# Patient Record
Sex: FEMALE | Race: WHITE | NOT HISPANIC OR LATINO | URBAN - METROPOLITAN AREA
[De-identification: names, ages, dates, MRNs, and addresses within clinical notes are randomized per-mention and may not be internally consistent; named-entity substitution may affect disease eponyms.]

---

## 2018-10-23 ENCOUNTER — OFFICE VISIT (OUTPATIENT)
Dept: URBAN - METROPOLITAN AREA CLINIC 45 | Facility: CLINIC | Age: 78
End: 2018-10-23
Payer: MEDICARE

## 2018-10-23 DIAGNOSIS — H43.813 VITREOUS DEGENERATION, BILATERAL: ICD-10-CM

## 2018-10-23 PROCEDURE — 92004 COMPRE OPH EXAM NEW PT 1/>: CPT | Performed by: OPHTHALMOLOGY

## 2018-10-23 RX ORDER — OFLOXACIN 3 MG/ML
0.3 % SOLUTION/ DROPS OPHTHALMIC
Qty: 10 | Refills: 0 | Status: INACTIVE
Start: 2018-10-23 | End: 2018-12-05

## 2018-10-23 RX ORDER — PREDNISOLONE ACETATE 10 MG/ML
1 % SUSPENSION/ DROPS OPHTHALMIC
Qty: 10 | Refills: 1 | Status: INACTIVE
Start: 2018-10-23 | End: 2018-12-05

## 2018-10-23 ASSESSMENT — INTRAOCULAR PRESSURE
OD: 13
OS: 13

## 2018-10-23 ASSESSMENT — KERATOMETRY
OD: 42.25
OS: 43.00

## 2018-10-23 ASSESSMENT — VISUAL ACUITY
OD: 20/30
OS: 20/30

## 2018-10-23 NOTE — IMPRESSION/PLAN
Impression: Combined forms of age-related cataract, bilateral: H25.813. Plan: OK for ce/iol OD then OS in [facility] [address] 27, RL2. Distance target. Discussed diagnosis of cataracts. Cataracts are limiting vision. Discussed risks, benefits and alternatives to surgery including but not limited to: bleeding, infection, risk of vision loss, loss of the eye, need for other surgery. Patient voiced understanding and wishes to proceed.

## 2018-10-23 NOTE — IMPRESSION/PLAN
Impression: Vitreous degeneration, bilateral: H43.813. Plan: Posterior vitreous detachment accounts for the patient's complaints. There is no evidence of retinal pathology. All signs and risks of retinal detachment and tears were discussed in detail. Patient instructed to call the office immediately if any symptoms noted.

## 2018-12-05 ENCOUNTER — TESTING ONLY (OUTPATIENT)
Dept: URBAN - METROPOLITAN AREA CLINIC 45 | Facility: CLINIC | Age: 78
End: 2018-12-05
Payer: MEDICARE

## 2018-12-05 DIAGNOSIS — H25.813 COMBINED FORMS OF AGE-RELATED CATARACT, BILATERAL: Primary | ICD-10-CM

## 2018-12-05 PROCEDURE — 92136 OPHTHALMIC BIOMETRY: CPT | Performed by: OPHTHALMOLOGY

## 2018-12-05 RX ORDER — OFLOXACIN 3 MG/ML
0.3 % SOLUTION/ DROPS OPHTHALMIC
Qty: 10 | Refills: 0 | Status: INACTIVE
Start: 2018-12-05 | End: 2019-01-16

## 2018-12-05 RX ORDER — PREDNISOLONE ACETATE 10 MG/ML
1 % SUSPENSION/ DROPS OPHTHALMIC
Qty: 10 | Refills: 1 | Status: INACTIVE
Start: 2018-12-05 | End: 2019-02-15

## 2018-12-05 ASSESSMENT — PACHYMETRY
OS: 22.64
OD: 2.95
OD: 22.60
OS: 2.79

## 2018-12-26 ENCOUNTER — SURGERY (OUTPATIENT)
Dept: URBAN - METROPOLITAN AREA SURGERY 20 | Facility: SURGERY | Age: 78
End: 2018-12-26
Payer: MEDICARE

## 2018-12-26 PROCEDURE — 66984 XCAPSL CTRC RMVL W/O ECP: CPT | Performed by: OPHTHALMOLOGY

## 2018-12-27 ENCOUNTER — POST-OPERATIVE VISIT (OUTPATIENT)
Dept: URBAN - METROPOLITAN AREA CLINIC 45 | Facility: CLINIC | Age: 78
End: 2018-12-27

## 2018-12-27 PROCEDURE — 99024 POSTOP FOLLOW-UP VISIT: CPT | Performed by: OPTOMETRIST

## 2018-12-27 ASSESSMENT — INTRAOCULAR PRESSURE
OD: 22
OS: 13

## 2018-12-31 ENCOUNTER — POST-OPERATIVE VISIT (OUTPATIENT)
Dept: URBAN - METROPOLITAN AREA CLINIC 45 | Facility: CLINIC | Age: 78
End: 2018-12-31

## 2018-12-31 DIAGNOSIS — Z09 ENCNTR FOR F/U EXAM AFT TRTMT FOR COND OTH THAN MALIG NEOPLM: Primary | ICD-10-CM

## 2018-12-31 PROCEDURE — 99024 POSTOP FOLLOW-UP VISIT: CPT | Performed by: OPHTHALMOLOGY

## 2018-12-31 ASSESSMENT — INTRAOCULAR PRESSURE
OD: 17
OS: 14

## 2019-01-08 ENCOUNTER — POST-OPERATIVE VISIT (OUTPATIENT)
Dept: URBAN - METROPOLITAN AREA CLINIC 45 | Facility: CLINIC | Age: 79
End: 2019-01-08

## 2019-01-08 PROCEDURE — 99024 POSTOP FOLLOW-UP VISIT: CPT | Performed by: OPTOMETRIST

## 2019-01-08 ASSESSMENT — INTRAOCULAR PRESSURE
OS: 14
OD: 12

## 2019-01-08 ASSESSMENT — VISUAL ACUITY: OD: 20/30

## 2019-01-09 ENCOUNTER — SURGERY (OUTPATIENT)
Dept: URBAN - METROPOLITAN AREA SURGERY 20 | Facility: SURGERY | Age: 79
End: 2019-01-09
Payer: MEDICARE

## 2019-01-09 PROCEDURE — 66984 XCAPSL CTRC RMVL W/O ECP: CPT | Performed by: OPHTHALMOLOGY

## 2019-01-10 ENCOUNTER — POST-OPERATIVE VISIT (OUTPATIENT)
Dept: URBAN - METROPOLITAN AREA CLINIC 45 | Facility: CLINIC | Age: 79
End: 2019-01-10

## 2019-01-10 PROCEDURE — 99024 POSTOP FOLLOW-UP VISIT: CPT | Performed by: OPTOMETRIST

## 2019-01-10 ASSESSMENT — INTRAOCULAR PRESSURE
OS: 14
OD: 12

## 2019-01-16 ENCOUNTER — POST-OPERATIVE VISIT (OUTPATIENT)
Dept: URBAN - METROPOLITAN AREA CLINIC 45 | Facility: CLINIC | Age: 79
End: 2019-01-16

## 2019-01-16 PROCEDURE — 99024 POSTOP FOLLOW-UP VISIT: CPT | Performed by: OPTOMETRIST

## 2019-01-16 ASSESSMENT — INTRAOCULAR PRESSURE
OD: 12
OS: 13

## 2019-02-15 ENCOUNTER — POST-OPERATIVE VISIT (OUTPATIENT)
Dept: URBAN - METROPOLITAN AREA CLINIC 45 | Facility: CLINIC | Age: 79
End: 2019-02-15
Payer: MEDICARE

## 2019-02-15 DIAGNOSIS — H52.4 PRESBYOPIA: ICD-10-CM

## 2019-02-15 PROCEDURE — 99024 POSTOP FOLLOW-UP VISIT: CPT | Performed by: OPTOMETRIST

## 2019-02-15 ASSESSMENT — INTRAOCULAR PRESSURE
OS: 12
OD: 13

## 2019-02-15 ASSESSMENT — VISUAL ACUITY
OD: 20/25
OS: 20/25

## 2019-02-27 ENCOUNTER — OFFICE VISIT (OUTPATIENT)
Dept: URBAN - METROPOLITAN AREA CLINIC 45 | Facility: CLINIC | Age: 79
End: 2019-02-27
Payer: MEDICARE

## 2019-02-27 DIAGNOSIS — H02.35 BLEPHAROCHALASIS LEFT LOWER EYELID: ICD-10-CM

## 2019-02-27 DIAGNOSIS — H02.32 BLEPHAROCHALASIS RIGHT LOWER EYELID: Primary | ICD-10-CM

## 2019-02-27 PROCEDURE — 92285 EXTERNAL OCULAR PHOTOGRAPHY: CPT | Performed by: OPHTHALMOLOGY

## 2019-02-27 PROCEDURE — 99214 OFFICE O/P EST MOD 30 MIN: CPT | Performed by: OPHTHALMOLOGY

## 2019-02-27 ASSESSMENT — INTRAOCULAR PRESSURE
OS: 12
OD: 12

## 2019-02-27 NOTE — IMPRESSION/PLAN
Impression: Blepharochalasis right lower eyelid: H02.32. Photo Interpretation: supports clinical findings and dx documented in chart Plan: Discussed diagnosis in detail with patient. Discussed treatment options with patient. Discussed cosmetic bilateral lower eyelid blepharoplasty in detail with patient today. RBA's discussed with patient. Patient does not wish to proceed with surgical intervention at this time.

## 2019-03-01 ENCOUNTER — RX ONLY (RX ONLY)
Age: 79
End: 2019-03-01

## 2019-04-09 ENCOUNTER — POST-OPERATIVE VISIT (OUTPATIENT)
Dept: URBAN - METROPOLITAN AREA CLINIC 45 | Facility: CLINIC | Age: 79
End: 2019-04-09

## 2019-04-09 PROCEDURE — 99024 POSTOP FOLLOW-UP VISIT: CPT | Performed by: OPTOMETRIST

## 2019-04-09 ASSESSMENT — INTRAOCULAR PRESSURE
OS: 12
OD: 12

## 2019-11-07 ENCOUNTER — APPOINTMENT (OUTPATIENT)
Age: 79
Setting detail: DERMATOLOGY
End: 2019-11-07

## 2019-11-07 VITALS
HEART RATE: 66 BPM | DIASTOLIC BLOOD PRESSURE: 90 MMHG | SYSTOLIC BLOOD PRESSURE: 140 MMHG | WEIGHT: 202 LBS | HEIGHT: 64 IN

## 2019-11-07 DIAGNOSIS — L30.4 ERYTHEMA INTERTRIGO: ICD-10-CM

## 2019-11-07 DIAGNOSIS — L82.0 INFLAMED SEBORRHEIC KERATOSIS: ICD-10-CM

## 2019-11-07 DIAGNOSIS — R03.0 ELEVATED BLOOD-PRESSURE READING, WITHOUT DIAGNOSIS OF HYPERTENSION: ICD-10-CM

## 2019-11-07 PROCEDURE — OTHER LIQUID NITROGEN: OTHER

## 2019-11-07 PROCEDURE — 17110 DESTRUCT B9 LESION 1-14: CPT

## 2019-11-07 PROCEDURE — 99213 OFFICE O/P EST LOW 20 MIN: CPT | Mod: 25

## 2019-11-07 PROCEDURE — OTHER PLAN FOR BMI MANAGEMENT: OTHER

## 2019-11-07 PROCEDURE — OTHER COUNSELING: OTHER

## 2019-11-07 PROCEDURE — OTHER MIPS QUALITY: OTHER

## 2019-11-07 PROCEDURE — OTHER PRESCRIPTION: OTHER

## 2019-11-07 RX ORDER — IODOQUINOL, HYDROCORTISONE ACETATE AND ALOE VERA LEAF 10; 20; 10 MG/G; MG/G; MG/G
GEL TOPICAL
Qty: 1 | Refills: 5 | Status: ERX | COMMUNITY
Start: 2019-11-07

## 2019-11-07 ASSESSMENT — LOCATION SIMPLE DESCRIPTION DERM
LOCATION SIMPLE: GROIN
LOCATION SIMPLE: RIGHT CLAVICULAR SKIN
LOCATION SIMPLE: ABDOMEN

## 2019-11-07 ASSESSMENT — LOCATION DETAILED DESCRIPTION DERM
LOCATION DETAILED: RIGHT RIB CAGE
LOCATION DETAILED: LEFT SUPRAPUBIC SKIN
LOCATION DETAILED: LEFT RIB CAGE
LOCATION DETAILED: RIGHT CLAVICULAR SKIN

## 2019-11-07 ASSESSMENT — LOCATION ZONE DERM: LOCATION ZONE: TRUNK

## 2019-11-07 NOTE — HPI: RASH (INTERTRIGO)
How Severe Is It?: moderate
Is This A New Presentation, Or A Follow-Up?: Follow Up Intertrigo
Additional History: She saw Dr. Emerson: 3/1/2019 for intertrigo under the breasts and on the abdominal fold.  Dr. Emerson prescribed ALCORTIN A GEL.  She reports it has been effective.  She uses that 3-4 times a week.  She requests a refill.

## 2019-11-07 NOTE — PROCEDURE: LIQUID NITROGEN
Medical Necessity Clause: This procedure was medically necessary because the lesions that were treated were: itchy
Consent: The patient's consent was obtained including but not limited to risks of crusting, scabbing, blistering, scarring, darker or lighter pigmentary change, recurrence, incomplete removal and infection.
Detail Level: Detailed
Add 52 Modifier (Optional): no
Render Note In Bullet Format When Appropriate: Yes
Medical Necessity Information: It is in your best interest to select a reason for this procedure from the list below. All of these items fulfill various CMS LCD requirements except the new and changing color options.
Post-Care Instructions: I reviewed with the patient in detail post-care instructions. Patient is to wear sunprotection, and avoid picking at any of the treated lesions. Pt may apply Vaseline to crusted or scabbing areas.

## 2019-11-07 NOTE — PROCEDURE: MIPS QUALITY
Quality 128: Preventive Care And Screening: Body Mass Index (Bmi) Screening And Follow-Up Plan: BMI is documented above normal parameters and a follow-up plan is documented
Quality 402: Tobacco Use And Help With Quitting Among Adolescents: Patient screened for tobacco and is an ex-smoker
Quality 111:Pneumonia Vaccination Status For Older Adults: Pneumococcal Vaccination Previously Received
Quality 110: Preventive Care And Screening: Influenza Immunization: Influenza Immunization Administered during Influenza season
Quality 317: Preventative Care And Screening: Screening For High Blood Pressure And Follow-Up Documented: Pre-hypertensive or hypertensive blood pressure reading documented, and the indicated follow-up is documented
Quality 130: Documentation Of Current Medications In The Medical Record: Current Medications Documented
Detail Level: Detailed

## 2019-11-07 NOTE — HPI: EVALUATION OF SKIN LESION(S)
What Type Of Note Output Would You Prefer (Optional)?: Bullet Format
How Severe Are Your Spot(S)?: moderate
Have Your Spot(S) Been Treated In The Past?: has not been treated
Hpi Title: Evaluation of a Skin Lesion
Year Removed: 1900

## 2019-12-31 ENCOUNTER — OFFICE VISIT (OUTPATIENT)
Dept: URBAN - METROPOLITAN AREA CLINIC 45 | Facility: CLINIC | Age: 79
End: 2019-12-31
Payer: MEDICARE

## 2019-12-31 DIAGNOSIS — D23.111 OTHER BENIGN NEOPLASM OF SKIN OF RIGHT UPPER EYELID, INCLUDING CANTHUS: Primary | ICD-10-CM

## 2019-12-31 PROCEDURE — 92014 COMPRE OPH EXAM EST PT 1/>: CPT | Performed by: OPTOMETRIST

## 2019-12-31 RX ORDER — ERYTHROMYCIN 5 MG/G
OINTMENT OPHTHALMIC
Qty: 1 | Refills: 0 | Status: ACTIVE
Start: 2019-12-31

## 2019-12-31 ASSESSMENT — KERATOMETRY
OD: 42.63
OS: 43.63

## 2019-12-31 ASSESSMENT — INTRAOCULAR PRESSURE
OD: 12
OS: 15

## 2019-12-31 NOTE — IMPRESSION/PLAN
Impression: Open angle with borderline findings, high risk, bilateral: H40.023.  Plan: return for glaucoma testing: OCT, VF, pachs

## 2019-12-31 NOTE — IMPRESSION/PLAN
Impression: Other benign neoplasm of skin of right upper eyelid, including canthus: D23.111.  Plan: ERYTHROMYCIN SHERI QID RUL LESION

## 2020-01-07 ENCOUNTER — OFFICE VISIT (OUTPATIENT)
Dept: URBAN - METROPOLITAN AREA CLINIC 45 | Facility: CLINIC | Age: 80
End: 2020-01-07
Payer: MEDICARE

## 2020-01-07 DIAGNOSIS — H01.001 BLEPHARITIS OF RIGHT UPPER EYELID: Primary | ICD-10-CM

## 2020-01-07 DIAGNOSIS — H01.004 BLEPHARITIS OF LEFT UPPER EYELID: ICD-10-CM

## 2020-01-07 DIAGNOSIS — H01.8 OTHER SPECIFIED INFLAMMATION OF EYELID: ICD-10-CM

## 2020-01-07 PROCEDURE — 92012 INTRM OPH EXAM EST PATIENT: CPT | Performed by: OPTOMETRIST

## 2020-01-07 ASSESSMENT — INTRAOCULAR PRESSURE
OS: 14
OD: 15

## 2020-01-07 NOTE — IMPRESSION/PLAN
Impression: Other specified inflammation of eyelid: H01.8. Plan: finish erythromycin janet, apply to lids qhs until tube is empty.

## 2020-02-12 ENCOUNTER — OFFICE VISIT (OUTPATIENT)
Dept: URBAN - METROPOLITAN AREA CLINIC 45 | Facility: CLINIC | Age: 80
End: 2020-02-12
Payer: MEDICARE

## 2020-02-12 DIAGNOSIS — H40.023 OPEN ANGLE WITH BORDERLINE FINDINGS, HIGH RISK, BILATERAL: ICD-10-CM

## 2020-02-12 PROCEDURE — 92083 EXTENDED VISUAL FIELD XM: CPT | Performed by: OPTOMETRIST

## 2020-02-12 PROCEDURE — 92133 CPTRZD OPH DX IMG PST SGM ON: CPT | Performed by: OPTOMETRIST

## 2020-02-12 PROCEDURE — 92014 COMPRE OPH EXAM EST PT 1/>: CPT | Performed by: OPTOMETRIST

## 2020-02-12 PROCEDURE — 76514 ECHO EXAM OF EYE THICKNESS: CPT | Performed by: OPTOMETRIST

## 2020-02-12 ASSESSMENT — INTRAOCULAR PRESSURE
OD: 15
OS: 14

## 2020-02-12 NOTE — IMPRESSION/PLAN
Impression: Open angle with borderline findings, high risk, bilateral: H40.023.  Plan: normal vf, oct, iop, thick cct

## 2020-07-09 ENCOUNTER — APPOINTMENT (OUTPATIENT)
Age: 80
Setting detail: DERMATOLOGY
End: 2020-07-09

## 2020-07-09 DIAGNOSIS — L30.4 ERYTHEMA INTERTRIGO: ICD-10-CM

## 2020-07-09 DIAGNOSIS — L82.1 OTHER SEBORRHEIC KERATOSIS: ICD-10-CM

## 2020-07-09 DIAGNOSIS — L28.1 PRURIGO NODULARIS: ICD-10-CM

## 2020-07-09 DIAGNOSIS — Z85.828 PERSONAL HISTORY OF OTHER MALIGNANT NEOPLASM OF SKIN: ICD-10-CM

## 2020-07-09 PROBLEM — D48.5 NEOPLASM OF UNCERTAIN BEHAVIOR OF SKIN: Status: ACTIVE | Noted: 2020-07-09

## 2020-07-09 PROCEDURE — OTHER COUNSELING: OTHER

## 2020-07-09 PROCEDURE — 11102 TANGNTL BX SKIN SINGLE LES: CPT

## 2020-07-09 PROCEDURE — OTHER BIOPSY BY SHAVE METHOD: OTHER

## 2020-07-09 PROCEDURE — 99214 OFFICE O/P EST MOD 30 MIN: CPT | Mod: 25

## 2020-07-09 PROCEDURE — OTHER MIPS QUALITY: OTHER

## 2020-07-09 PROCEDURE — OTHER MEDICATION COUNSELING: OTHER

## 2020-07-09 PROCEDURE — OTHER PRESCRIPTION: OTHER

## 2020-07-09 PROCEDURE — OTHER TREATMENT REGIMEN: OTHER

## 2020-07-09 RX ORDER — DESONIDE 0.5 MG/G
CREAM TOPICAL BID PRN
Qty: 1 | Refills: 4 | Status: ERX | COMMUNITY
Start: 2020-07-09

## 2020-07-09 ASSESSMENT — LOCATION DETAILED DESCRIPTION DERM
LOCATION DETAILED: RIGHT ANTERIOR PROXIMAL THIGH
LOCATION DETAILED: LEFT INFRAMAMMARY CREASE (INNER QUADRANT)
LOCATION DETAILED: RIGHT KNEE
LOCATION DETAILED: SUPERIOR THORACIC SPINE
LOCATION DETAILED: LEFT SUPRAPUBIC SKIN
LOCATION DETAILED: RIGHT INFRAMAMMARY CREASE (OUTER QUADRANT)
LOCATION DETAILED: LEFT ANTERIOR PROXIMAL THIGH
LOCATION DETAILED: RIGHT RIB CAGE

## 2020-07-09 ASSESSMENT — LOCATION SIMPLE DESCRIPTION DERM
LOCATION SIMPLE: LEFT THIGH
LOCATION SIMPLE: UPPER BACK
LOCATION SIMPLE: RIGHT KNEE
LOCATION SIMPLE: RIGHT BREAST
LOCATION SIMPLE: ABDOMEN
LOCATION SIMPLE: LEFT BREAST
LOCATION SIMPLE: RIGHT THIGH
LOCATION SIMPLE: GROIN

## 2020-07-09 ASSESSMENT — BSA RASH: BSA RASH: 2

## 2020-07-09 ASSESSMENT — LOCATION ZONE DERM
LOCATION ZONE: TRUNK
LOCATION ZONE: LEG

## 2020-07-09 ASSESSMENT — SEVERITY ASSESSMENT: SEVERITY: MILD TO MODERATE

## 2020-07-09 NOTE — PROCEDURE: MEDICATION COUNSELING
HPI     4 day PO   DLS- 03/28/2019 Dr. Chaves     Pt sts still has bubble blocking vision and still the same no change.   Slight achy pain 1-2/10 pain scale comes and goes     VIG PF HA QID   Cosopt BID          Prior OCT - RD OD  OS - No ME      A/P    1. RRD OD   Macula involving x 4  Days    S/p  25g PPV/EL/SF6 OD for RRD 9/6/17  IOP improved    10/30/17 - Recurrent IT RD with early PVR and small break    s/p 25g PPV/membrane stripping/inferior retinopexy/C3F8 OD for RRD/PVR OD 11/1/17 12/4/17 - recurrent inferior RD - small holes posterior to laser    s/p 25g PPV/EL/AFx/1000cs Silicone Oil OD for Recurrent RRD with PVR 12/6/17    Doing well, good IOP    1/9/18 increasing inferior fluid collection beneath oil into macula - needs SB - multiple small break    s/p /270, 25g PPV/SO removal/membrane stripping/AFx/EL/1000cs Silicone Oil OD for RRD/PVR 1/10/18    Doing well, good IOP  Had ST scleral dehiscence - closed with 5-0 mersilene    Side sleep or stomach    Inferior PVR OD with shallow submacular fluid   IOP low - stop Cosopt  Will likely need inferior retinectomy    2/18 - some progression    s/p 25 PPV/SO removal/membrane stripping/inferior retinectomy/posterior capsulectomy/EL/Leonora oil OD for RRD with PVR and PCO OD 4/11/18    Stable inferonasal RD under oil  IOP increased  - continue COsopt BID  Continue PF Q day     Will need heavier inferonasal laser during oil removal 3-6 months (around 4/19)    S/p 25g PPV/1000cs SO removal/EL/AFx/C3F8 OD 3/27/19    Doing well, good IOP    HA and PF BID  Side positioning x 2 days  Then ok for face forward throughout the day and then sleep on either side.     2. PCIOL OU    3. HTN   Good BP control    4. PVD OS  No breaks OS     Otezla Pregnancy And Lactation Text: This medication is Pregnancy Category C and it isn't known if it is safe during pregnancy. It is unknown if it is excreted in breast milk.

## 2020-07-09 NOTE — PROCEDURE: BIOPSY BY SHAVE METHOD
Validate Triangulation: No
Biopsy Method: 15 blade
Path Notes (To The Dermatopathologist): Please check margins if malignant.
Notification Instructions: Patient will be notified of biopsy results. However, patient instructed to call the office if not contacted within 3 weeks.
Dressing: bandage
Wound Care: Petrolatum
Silver Nitrate Text: The wound bed was treated with silver nitrate after the biopsy was performed.
Additional Anesthesia Volume In Cc (Will Not Render If 0): 0
Render Path Notes In Note?: Yes
Electrodesiccation And Curettage Text: The wound bed was treated with electrodesiccation and curettage after the biopsy was performed.
Information: Selecting Yes will display possible errors in your note based on the variables you have selected. This validation is only offered as a suggestion for you. PLEASE NOTE THAT THE VALIDATION TEXT WILL BE REMOVED WHEN YOU FINALIZE YOUR NOTE. IF YOU WANT TO FAX A PRELIMINARY NOTE YOU WILL NEED TO TOGGLE THIS TO 'NO' IF YOU DO NOT WANT IT IN YOUR FAXED NOTE.
Biopsy Type: H and E
Size Of Lesion In Cm: 0.3
Depth Of Biopsy: dermis
Consent: Written consent and verbal consent were obtained and risks were reviewed including but not limited to scarring, infection, bleeding, scabbing, incomplete removal and allergy to anesthesia.
Detail Level: Detailed
Electrodesiccation Text: The wound bed was treated with electrodesiccation after the biopsy was performed.
Anesthesia Type: 1% lidocaine with epinephrine and a 1:10 solution of 8.4% sodium bicarbonate
Post-Care Instructions: .\\n                                                                                 (Chart note: No repair was needed.)\\n- POSTOPERATIVE INSTRUCTIONS: We gave detailed verbal and printed instructions including the following:\\n1.  Wash hands thoroughly with soap \\n2.  Cleanse the area with gentle soap and water.  A Q-tip may be used.\\n3.  Pat dry and then apply Vaseline ointment (from a tube, not a jar) using a Q-tip\\n4.  Apply a dressing\\n5.  Cleanse wound 2 times a day until the wound is healed.
Curettage Text: The wound bed was treated with curettage after the biopsy was performed.
Cryotherapy Text: The wound bed was treated with cryotherapy after the biopsy was performed.
Anesthesia Volume In Cc (Will Not Render If 0): 1.5
Hemostasis: Electrocautery
Type Of Destruction Used: Curettage
Billing Type: Third-Party Bill

## 2020-07-09 NOTE — PROCEDURE: MEDICATION COUNSELING
Xelyuliyaz Pregnancy And Lactation Text: This medication is Pregnancy Category D and is not considered safe during pregnancy.  The risk during breast feeding is also uncertain.

## 2020-07-09 NOTE — PROCEDURE: TREATMENT REGIMEN
Initiate Treatment: Desonide twice daily under breasts
Otc Regimen: Clotrimazole twice daily under the breasts
Detail Level: Detailed

## 2020-07-09 NOTE — PROCEDURE: MEDICATION COUNSELING
04-May-2019 Doxycycline Counseling:  Patient counseled regarding possible photosensitivity and increased risk for sunburn.  Patient instructed to avoid sunlight, if possible.  When exposed to sunlight, patients should wear protective clothing, sunglasses, and sunscreen.  The patient was instructed to call the office immediately if the following severe adverse effects occur:  hearing changes, easy bruising/bleeding, severe headache, or vision changes.  The patient verbalized understanding of the proper use and possible adverse effects of doxycycline.  All of the patient's questions and concerns were addressed.

## 2020-07-09 NOTE — PROCEDURE: MIPS QUALITY
Quality 110: Preventive Care And Screening: Influenza Immunization: Influenza Immunization Administered during Influenza season
Quality 47: Advance Care Plan: Advance Care Planning discussed and documented in the medical record; patient did not wish or was not able to name a surrogate decision maker or provide an advance care plan.
Quality 111:Pneumonia Vaccination Status For Older Adults: Pneumococcal Vaccination Previously Received
Quality 226: Preventive Care And Screening: Tobacco Use: Screening And Cessation Intervention: Patient screened for tobacco use and is an ex/non-smoker
Detail Level: Detailed
Quality 130: Documentation Of Current Medications In The Medical Record: Current Medications Documented

## 2022-02-17 ENCOUNTER — APPOINTMENT (OUTPATIENT)
Age: 82
Setting detail: DERMATOLOGY
End: 2022-02-17

## 2022-02-17 VITALS — DIASTOLIC BLOOD PRESSURE: 82 MMHG | SYSTOLIC BLOOD PRESSURE: 148 MMHG

## 2022-02-17 DIAGNOSIS — L85.3 XEROSIS CUTIS: ICD-10-CM

## 2022-02-17 DIAGNOSIS — R03.0 ELEVATED BLOOD-PRESSURE READING, WITHOUT DIAGNOSIS OF HYPERTENSION: ICD-10-CM

## 2022-02-17 DIAGNOSIS — L30.4 ERYTHEMA INTERTRIGO: ICD-10-CM

## 2022-02-17 DIAGNOSIS — L82.0 INFLAMED SEBORRHEIC KERATOSIS: ICD-10-CM

## 2022-02-17 DIAGNOSIS — Z85.828 PERSONAL HISTORY OF OTHER MALIGNANT NEOPLASM OF SKIN: ICD-10-CM

## 2022-02-17 DIAGNOSIS — D485 NEOPLASM OF UNCERTAIN BEHAVIOR OF SKIN: ICD-10-CM

## 2022-02-17 PROBLEM — D48.5 NEOPLASM OF UNCERTAIN BEHAVIOR OF SKIN: Status: ACTIVE | Noted: 2022-02-17

## 2022-02-17 PROCEDURE — OTHER COUNSELING: OTHER

## 2022-02-17 PROCEDURE — 99213 OFFICE O/P EST LOW 20 MIN: CPT | Mod: 25

## 2022-02-17 PROCEDURE — OTHER MIPS QUALITY: OTHER

## 2022-02-17 PROCEDURE — OTHER TREATMENT REGIMEN: OTHER

## 2022-02-17 PROCEDURE — OTHER BIOPSY BY SHAVE METHOD: OTHER

## 2022-02-17 PROCEDURE — OTHER BENIGN DESTRUCTION: OTHER

## 2022-02-17 PROCEDURE — 11102 TANGNTL BX SKIN SINGLE LES: CPT | Mod: 59

## 2022-02-17 PROCEDURE — 17110 DESTRUCT B9 LESION 1-14: CPT

## 2022-02-17 ASSESSMENT — LOCATION DETAILED DESCRIPTION DERM
LOCATION DETAILED: LEFT PROXIMAL POSTERIOR UPPER ARM
LOCATION DETAILED: LEFT MEDIAL BREAST 6-7:00 REGION
LOCATION DETAILED: LEFT CENTRAL ZYGOMA
LOCATION DETAILED: LEFT ANTERIOR PROXIMAL UPPER ARM
LOCATION DETAILED: LEFT SUPERIOR MEDIAL UPPER BACK
LOCATION DETAILED: RIGHT RIB CAGE
LOCATION DETAILED: MIDDLE STERNUM
LOCATION DETAILED: LEFT MID-UPPER BACK
LOCATION DETAILED: RIGHT ANTERIOR PROXIMAL UPPER ARM
LOCATION DETAILED: RIGHT SUPERIOR UPPER BACK
LOCATION DETAILED: RIGHT SUPERIOR LATERAL UPPER BACK
LOCATION DETAILED: INFERIOR THORACIC SPINE

## 2022-02-17 ASSESSMENT — LOCATION SIMPLE DESCRIPTION DERM
LOCATION SIMPLE: LEFT UPPER ARM
LOCATION SIMPLE: LEFT POSTERIOR UPPER ARM
LOCATION SIMPLE: RIGHT BACK
LOCATION SIMPLE: ABDOMEN
LOCATION SIMPLE: CHEST
LOCATION SIMPLE: RIGHT UPPER ARM
LOCATION SIMPLE: RIGHT UPPER BACK
LOCATION SIMPLE: LEFT UPPER BACK
LOCATION SIMPLE: LEFT BREAST
LOCATION SIMPLE: UPPER BACK
LOCATION SIMPLE: LEFT ZYGOMA

## 2022-02-17 ASSESSMENT — LOCATION ZONE DERM
LOCATION ZONE: ARM
LOCATION ZONE: TRUNK
LOCATION ZONE: FACE

## 2022-02-17 NOTE — PROCEDURE: BENIGN DESTRUCTION
Include Z78.9 (Other Specified Conditions Influencing Health Status) As An Associated Diagnosis?: Yes
Render Post-Care Instructions In Note?: no
Anesthesia Volume In Cc: 0.5
Consent: The patient's consent was obtained including but not limited to risks of crusting, scabbing, blistering, scarring, darker or lighter pigmentary change, recurrence, incomplete removal and infection.
Medical Necessity Information: It is in your best interest to select a reason for this procedure from the list below. All of these items fulfill various CMS LCD requirements except the new and changing color options.
Detail Level: Detailed
Post-Care Instructions: I reviewed with the patient in detail post-care instructions. Patient is to wear sunprotection, and avoid picking at any of the treated lesions. Pt may apply Vaseline to crusted or scabbing areas.
Medical Necessity Clause: This procedure was medically necessary because the lesions that were treated were:
Treatment Number (Will Not Render If 0): 0

## 2022-02-17 NOTE — PROCEDURE: MIPS QUALITY
Quality 111:Pneumonia Vaccination Status For Older Adults: Pneumococcal Vaccination Previously Received
Quality 317: Preventative Care And Screening: Screening For High Blood Pressure And Follow-Up Documented: Pre-hypertensive or hypertensive blood pressure reading documented, and the indicated follow-up is documented
Quality 130: Documentation Of Current Medications In The Medical Record: Current Medications Documented
Detail Level: Detailed
Quality 110: Preventive Care And Screening: Influenza Immunization: Influenza Immunization Administered during Influenza season
Quality 47: Advance Care Plan: Advance Care Planning discussed and documented; advance care plan or surrogate decision maker documented in the medical record.
Quality 226: Preventive Care And Screening: Tobacco Use: Screening And Cessation Intervention: Patient screened for tobacco use and is an ex/non-smoker

## 2022-02-17 NOTE — PROCEDURE: BIOPSY BY SHAVE METHOD
Validate That Anesthesia Is Not 0: No
Size Of Lesion In Cm: 3.5
Silver Nitrate Text: The wound bed was treated with silver nitrate after the biopsy was performed.
Information: Selecting Yes will display possible errors in your note based on the variables you have selected. This validation is only offered as a suggestion for you. PLEASE NOTE THAT THE VALIDATION TEXT WILL BE REMOVED WHEN YOU FINALIZE YOUR NOTE. IF YOU WANT TO FAX A PRELIMINARY NOTE YOU WILL NEED TO TOGGLE THIS TO 'NO' IF YOU DO NOT WANT IT IN YOUR FAXED NOTE.
Anesthesia Volume In Cc (Will Not Render If 0): 0.5
Dressing: bandage
Detail Level: Detailed
Electrodesiccation And Curettage Text: The wound bed was treated with electrodesiccation and curettage after the biopsy was performed.
Wound Care: Petrolatum
Consent: Written consent was obtained and risks were reviewed including but not limited to scarring, infection, bleeding, scabbing, incomplete removal, nerve damage and allergy to anesthesia.
Electrodesiccation Text: The wound bed was treated with electrodesiccation after the biopsy was performed.
Type Of Destruction Used: Curettage
Biopsy Method: Dermablade
Notification Instructions: Patient will be notified of biopsy results. However, patient instructed to call the office if not contacted within 2 weeks.
Biopsy Type: H and E
Was A Bandage Applied: Yes
Additional Anesthesia Volume In Cc (Will Not Render If 0): 0
Anesthesia Type: 1% lidocaine with epinephrine and a 1:10 solution of 8.4% sodium bicarbonate
Cryotherapy Text: The wound bed was treated with cryotherapy after the biopsy was performed.
X Size Of Lesion In Cm: 2.8
Depth Of Biopsy: dermis
Post-Care Instructions: I reviewed with the patient in detail post-care instructions. Patient is to keep the biopsy site dry overnight, and then apply bacitracin twice daily until healed. Patient may apply hydrogen peroxide soaks to remove any crusting.
Hemostasis: Electrocautery
Billing Type: Third-Party Bill
Path Notes (To The Dermatopathologist): Melanoma
Curettage Text: The wound bed was treated with curettage after the biopsy was performed.

## 2022-02-17 NOTE — PROCEDURE: TREATMENT REGIMEN
Detail Level: Zone
Plan: Recommended cold packs for itching, instead of scratching.
Plan: Recommended she dry the area with a hair dryer after showering.
Continue Regimen: Continue nystatin powder that was prescribed by her pcp.
Initiate Treatment: Moisturize the body daily after showering for dryness and itching.

## 2022-02-17 NOTE — HPI: SKIN LESIONS
How Severe Is Your Skin Lesion?: severe
Have Your Skin Lesions Been Treated?: not been treated
Is This A New Presentation, Or A Follow-Up?: Skin Lesions
Additional History: She states that she is very itchy, not using any moisturizer for the itching.

## 2022-07-13 NOTE — PROCEDURE: MEDICATION COUNSELING
Depth Of Tumor Invasion (For Histology): tumor not visualized (deep and peripheral margins are clear of tumor) Drysol Pregnancy And Lactation Text: This medication is considered safe during pregnancy and breast feeding.

## 2023-01-26 ENCOUNTER — OFFICE VISIT (OUTPATIENT)
Dept: URBAN - METROPOLITAN AREA CLINIC 45 | Facility: CLINIC | Age: 83
End: 2023-01-26
Payer: MEDICARE

## 2023-01-26 DIAGNOSIS — H35.3131 NONEXUDATIVE MACULAR DEGENERATION, EARLY DRY STAGE, BILATERAL: ICD-10-CM

## 2023-01-26 DIAGNOSIS — H26.491 OTHER SECONDARY CATARACT, RIGHT EYE: ICD-10-CM

## 2023-01-26 DIAGNOSIS — H52.4 PRESBYOPIA: Primary | ICD-10-CM

## 2023-01-26 PROCEDURE — 92004 COMPRE OPH EXAM NEW PT 1/>: CPT | Performed by: OPHTHALMOLOGY

## 2023-01-26 ASSESSMENT — INTRAOCULAR PRESSURE
OS: 15
OD: 15

## 2023-01-26 ASSESSMENT — VISUAL ACUITY
OS: 20/20
OD: 20/25

## 2023-01-26 NOTE — IMPRESSION/PLAN
Impression: Nonexudative macular degeneration, early dry stage, bilateral: H35.3131. Plan: Slight RPE change. Stable . Continue to monitor

## 2023-03-29 NOTE — IMPRESSION/PLAN
----- Message from HEIDY Duran CNP sent at 3/29/2023  8:30 AM EDT -----  We need to repeat the Ct of her lungs in 1 year. However there were some lesions on her L2 pedicle so we need to do a different specific bone scan to look at this better before then. Central scheduling should be calling her to schedule.  Please let her know, thanks Impression: Blepharitis of right upper eyelid: H01.001. Plan: Lid scrubs and hygiene were explained. Patient instructed to apply warm compresses. Patient instructed to use artificial tears as needed.